# Patient Record
Sex: FEMALE | Race: WHITE
[De-identification: names, ages, dates, MRNs, and addresses within clinical notes are randomized per-mention and may not be internally consistent; named-entity substitution may affect disease eponyms.]

---

## 2017-12-06 ENCOUNTER — HOSPITAL ENCOUNTER (EMERGENCY)
Dept: HOSPITAL 10 - FTE | Age: 42
Discharge: HOME | End: 2017-12-06
Payer: MEDICAID

## 2017-12-06 VITALS
WEIGHT: 154.98 LBS | BODY MASS INDEX: 25.82 KG/M2 | HEIGHT: 65 IN | BODY MASS INDEX: 25.82 KG/M2 | WEIGHT: 154.98 LBS | HEIGHT: 65 IN

## 2017-12-06 VITALS
RESPIRATION RATE: 18 BRPM | DIASTOLIC BLOOD PRESSURE: 69 MMHG | TEMPERATURE: 98.4 F | SYSTOLIC BLOOD PRESSURE: 119 MMHG | HEART RATE: 69 BPM

## 2017-12-06 DIAGNOSIS — I10: ICD-10-CM

## 2017-12-06 DIAGNOSIS — K80.50: Primary | ICD-10-CM

## 2017-12-06 LAB
ADD UMIC: YES
ALBUMIN SERPL-MCNC: 4 G/DL (ref 3.3–4.9)
ALBUMIN/GLOB SERPL: 0.97 {RATIO}
ALP SERPL-CCNC: 97 IU/L (ref 42–121)
ALT SERPL-CCNC: 54 IU/L (ref 13–69)
ANION GAP SERPL CALC-SCNC: 15 MMOL/L (ref 8–16)
AST SERPL-CCNC: 78 IU/L (ref 15–46)
BASOPHILS # BLD AUTO: 0 10^3/UL (ref 0–0.1)
BASOPHILS NFR BLD: 0.5 % (ref 0–2)
BILIRUB DIRECT SERPL-MCNC: 0 MG/DL (ref 0–0.2)
BILIRUB SERPL-MCNC: 0.1 MG/DL (ref 0.2–1.3)
BUN SERPL-MCNC: 21 MG/DL (ref 7–20)
CALCIUM SERPL-MCNC: 9.5 MG/DL (ref 8.4–10.2)
CHLORIDE SERPL-SCNC: 104 MMOL/L (ref 97–110)
CO2 SERPL-SCNC: 25 MMOL/L (ref 21–31)
COLOR UR: YELLOW
CREAT SERPL-MCNC: 0.9 MG/DL (ref 0.44–1)
EOSINOPHIL # BLD: 0.1 10^3/UL (ref 0–0.5)
EOSINOPHIL NFR BLD: 1.3 % (ref 0–7)
ERYTHROCYTE [DISTWIDTH] IN BLOOD BY AUTOMATED COUNT: 15.9 % (ref 11.5–14.5)
GLOBULIN SER-MCNC: 4.1 G/DL (ref 1.3–3.2)
GLUCOSE SERPL-MCNC: 107 MG/DL (ref 70–220)
GLUCOSE UR STRIP-MCNC: NEGATIVE MG/DL
HCT VFR BLD CALC: 35.3 % (ref 37–47)
HGB BLD-MCNC: 11.5 G/DL (ref 12–16)
KETONES UR STRIP.AUTO-MCNC: NEGATIVE MG/DL
LYMPHOCYTES # BLD AUTO: 2.8 10^3/UL (ref 0.8–2.9)
LYMPHOCYTES NFR BLD AUTO: 35.3 % (ref 15–51)
MCH RBC QN AUTO: 25.8 PG (ref 29–33)
MCHC RBC AUTO-ENTMCNC: 32.6 G/DL (ref 32–37)
MCV RBC AUTO: 79.3 FL (ref 82–101)
MONOCYTES # BLD: 0.6 10^3/UL (ref 0.3–0.9)
MONOCYTES NFR BLD: 7.4 % (ref 0–11)
NEUTROPHILS # BLD: 4.4 10^3/UL (ref 1.6–7.5)
NEUTROPHILS NFR BLD AUTO: 55.4 % (ref 39–77)
NITRITE UR QL STRIP.AUTO: NEGATIVE MG/DL
NRBC # BLD MANUAL: 0 10^3/UL (ref 0–0)
NRBC BLD AUTO-RTO: 0 /100WBC (ref 0–0)
PLATELET # BLD: 306 10^3/UL (ref 140–415)
PMV BLD AUTO: 11.6 FL (ref 7.4–10.4)
POTASSIUM SERPL-SCNC: 3.8 MMOL/L (ref 3.5–5.1)
PROT SERPL-MCNC: 8.1 G/DL (ref 6.1–8.1)
RBC # BLD AUTO: 4.45 10^6/UL (ref 4.2–5.4)
RBC # UR AUTO: NEGATIVE MG/DL
SODIUM SERPL-SCNC: 140 MMOL/L (ref 135–144)
SQUAMOUS #/AREA URNS HPF: (no result) /HPF
UR ASCORBIC ACID: 40 MG/DL
UR BILIRUBIN (DIP): NEGATIVE MG/DL
UR CLARITY: CLEAR
UR PH (DIP): 5 (ref 5–9)
UR RBC: 1 /HPF (ref 0–5)
UR SPECIFIC GRAVITY (DIP): 1.02 (ref 1–1.03)
UR TOTAL PROTEIN (DIP): (no result) MG/DL
UROBILINOGEN UR STRIP-ACNC: NEGATIVE MG/DL
WBC # BLD AUTO: 8 10^3/UL (ref 4.8–10.8)
WBC # UR STRIP: NEGATIVE LEU/UL

## 2017-12-06 PROCEDURE — 81001 URINALYSIS AUTO W/SCOPE: CPT

## 2017-12-06 PROCEDURE — 93005 ELECTROCARDIOGRAM TRACING: CPT

## 2017-12-06 PROCEDURE — 83690 ASSAY OF LIPASE: CPT

## 2017-12-06 PROCEDURE — 36415 COLL VENOUS BLD VENIPUNCTURE: CPT

## 2017-12-06 PROCEDURE — 84484 ASSAY OF TROPONIN QUANT: CPT

## 2017-12-06 PROCEDURE — 85025 COMPLETE CBC W/AUTO DIFF WBC: CPT

## 2017-12-06 PROCEDURE — 81003 URINALYSIS AUTO W/O SCOPE: CPT

## 2017-12-06 PROCEDURE — 71010: CPT

## 2017-12-06 PROCEDURE — 80053 COMPREHEN METABOLIC PANEL: CPT

## 2017-12-06 PROCEDURE — 76705 ECHO EXAM OF ABDOMEN: CPT

## 2017-12-06 NOTE — RADRPT
PROCEDURE:   US abdomen limited right upper quadrant. 

 

CLINICAL INDICATION:   Abdominal pain 

 

TECHNIQUE:   Multiple real-time images were acquired of the patient's right upper quadrant of the ab
domen utilizing a high resolution transducer. 

 

COMPARISON:   US ABDOMEN 4/14/2013

 

FINDINGS:

 

Multiple gallstones in the gallbladder.

There is no pericholecystic fluid or gallbladder wall thickening.  

The common bile duct measures 9.9 mm in maximal dimension.  

No free fluid is identified. No abnormality is seen in the pancreas or liver.  The right kidney elke
ures 10.2 cm in length and is unremarkable.

 

IMPRESSION:

Cholelithiasis again seen. Dilated common bile duct again seen. Please see above.

 

 

RPTAT: HJES

_____________________________________________ 

.Carlos Ulloa MD, MD           Date    Time 

Electronically viewed and signed by .Carlos Ulloa MD, MD on 12/06/2017 03:06 

 

D:  12/06/2017 03:06  T:  12/06/2017 03:06

.S/

## 2017-12-06 NOTE — ERD
ER Documentation


Chief Complaint


Chief Complaint


epigastric pain 30 min PTA. "feels inflammed" -n/v





HPI


42-year-old female presents here to emergency department for complaints of 

epigastric pain sudden onset tonight was sleeping.  Patient has had a history 

of gastritis before.  Patient describes the pain as sharp pain, succession scale

, not better or worse with anything.  Intermittent type of pain.  Patient 

denies any fever chills.  Patient denies any vomiting diarrhea or constipation.

  Patient denies any fever chills.





ROS


All systems reviewed and are negative except as per history of present illness.





Medications


Home Meds


Reported Medications


Prenatal Vit-Iron Fumarate-FA (Prenatal Vitamin Tablet) 1 Each Tablet, 1 EACH 

PO DAILY


   13





Allergies


Allergies:  


Coded Allergies:  


     No Known Drug Allergies (Verified  Allergy, Unknown, 17)





PMhx/Soc


History of Surgery:  Yes ()


Anesthesia Reaction:  No


Hx Neurological Disorder:  No


Hx Respiratory Disorders:  No


Hx Cardiac Disorders:  Yes (Hypertension)


Hx Psychiatric Problems:  No


Hx Miscellaneous Medical Probl:  No


Hx Alcohol Use:  No


Hx Substance Use:  No


Hx Tobacco Use:  No


Smoking Status:  Never smoker





FmHx


Family History:  No coronary disease, No diabetes, No other





Physical Exam


Vitals





Vital Signs








  Date Time  Temp Pulse Resp B/P Pulse Ox O2 Delivery O2 Flow Rate FiO2


 


17 02:01 97.2 71 18 124/71 99   








Physical Exam


GENERAL:  The patient is well developed and appropriate for usual state of 

health, in no apparent distress.


CHEST:  Clear to auscultation bilaterally. There are no rales, wheezes or 

rhonchi. 


HEART:  Regular rate and rhythm. No murmurs, clicks, rubs or gallops. No S3 or 

S4.


ABDOMEN:  Soft, nontender and nondistended. Good bowel sounds. No rebound or 

guarding. No gross peritonitis. No gross organomegaly or masses. No Pierre sign 

or McBurney point tenderness.


BACK:  No midline or flank tenderness.


EXTREMITIES:  Equal pulses bilaterally. There is no peripheral clubbing, 

cyanosis or edema. No focal swelling or erythema. Full range of motion. Grossly 

neurovascularly intact.


NEURO:  Alert and oriented. Cranial nerves 2-12 intact. Motor strength in all 4 

extremities with 5/5 strength.  Sensation grossly intact. Normal speech and 

gait. 


SKIN:  There is no apparent rash or petechia. The skin is warm and dry.


HEMATOLOGIC AND LYMPHATIC:  There is no evidence of excessive bruising or 

lymphedema. No gross cervical, axillary, or inguinal lymphadenopathy.


Result Diagram:  


17 0245                                                                   

             17 0245





Results 24 hrs





 Laboratory Tests








Test


  17


02:11 17


02:36 17


02:45


 


Troponin I < 0.012ng/ml   


 


Bedside Urine pH (LAB)  6.0  


 


Bedside Urine Protein (LAB)  2+  


 


Bedside Urine Glucose (UA)  Negative  


 


Bedside Urine Ketones (LAB)  Negative  


 


Bedside Urine Blood  Trace-intact  


 


Bedside Urine Nitrite (LAB)  Negative  


 


Bedside Urine Leukocyte


Esterase (L 


  Negative 


  


 


 


White Blood Count   8.010^3/ul 


 


Red Blood Count   4.4510^6/ul 


 


Hemoglobin   11.5g/dl 


 


Hematocrit   35.3% 


 


Mean Corpuscular Volume   79.3fl 


 


Mean Corpuscular Hemoglobin   25.8pg 


 


Mean Corpuscular Hemoglobin


Concent 


  


  32.6g/dl 


 


 


Red Cell Distribution Width   15.9% 


 


Platelet Count   60071^3/UL 


 


Mean Platelet Volume   11.6fl 


 


Neutrophils %   55.4% 


 


Lymphocytes %   35.3% 


 


Monocytes %   7.4% 


 


Eosinophils %   1.3% 


 


Basophils %   0.5% 


 


Nucleated Red Blood Cells %   0.0/100WBC 


 


Neutrophils #   4.410^3/ul 


 


Lymphocytes #   2.810^3/ul 


 


Monocytes #   0.610^3/ul 


 


Eosinophils #   0.110^3/ul 


 


Basophils #   0.010^3/ul 


 


Nucleated Red Blood Cells #   0.010^3/ul 


 


Urine Color   YELLOW 


 


Urine Clarity   CLEAR 


 


Urine pH   5.0 


 


Urine Specific Gravity   1.020 


 


Urine Ketones   NEGATIVEmg/dL 


 


Urine Nitrite   NEGATIVEmg/dL 


 


Urine Bilirubin   NEGATIVEmg/dL 


 


Urine Urobilinogen   NEGATIVEmg/dL 


 


Urine Leukocyte Esterase   NEGATIVELeu/ul 


 


Urine Microscopic RBC   1/HPF 


 


Urine Microscopic WBC   3/HPF 


 


Urine Squamous Epithelial


Cells 


  


  FEW/HPF 


 


 


Urine Hemoglobin   NEGATIVEmg/dL 


 


Urine Glucose   NEGATIVEmg/dL 


 


Urine Total Protein   1+mg/dl 


 


Sodium Level   140mmol/L 


 


Potassium Level   3.8mmol/L 


 


Chloride Level   104mmol/L 


 


Carbon Dioxide Level   25mmol/L 


 


Anion Gap   15 


 


Blood Urea Nitrogen   21mg/dl 


 


Creatinine   0.90mg/dl 


 


Glucose Level   107mg/dl 


 


Calcium Level   9.5mg/dl 


 


Total Bilirubin   0.1mg/dl 


 


Direct Bilirubin   0.00mg/dl 


 


Indirect Bilirubin   0.1mg/dl 


 


Aspartate Amino Transf


(AST/SGOT) 


  


  78IU/L 


 


 


Alanine Aminotransferase


(ALT/SGPT) 


  


  54IU/L 


 


 


Alkaline Phosphatase   97IU/L 


 


Total Protein   8.1g/dl 


 


Albumin   4.0g/dl 


 


Globulin   4.10g/dl 


 


Albumin/Globulin Ratio   0.97 


 


Lipase   69U/L 





EKG was done, read by me and is normal sinus rhythm at a rate of 71, normal axis

, there is no ST changes or changes in the EKG that indicates any cardiac 

emergencies at this time. Patient's EKG was also reviewed by Dr. Sweeney. 

Impression: no acute findings on EKG








PROCEDURE:   XR Chest. 


 


CLINICAL INDICATION:    Epigastric pain


 


TECHNIQUE:   Single frontal view  of the chest was obtained 


 


COMPARISON:   None 


 


FINDINGS:


The heart and mediastinum are within normal limits.  


The lungs are clear.


There is appearance of minimal blunting of costophrenic angles which could be 

secondary small pleural effusions. 


 


 


IMPRESSION:


Suggestive of very small pleural effusions.


 


 


RPTAT: HJES


_____________________________________________ 


.Carlos Ulloa MD, MD           Date    Time 


Electronically viewed and signed by .Carlos Ulloa MD, MD on 2017 03:24 


 


D:  2017 03:24  T:  2017 03:24


.S/





CC: PER CHIU NP





Procedures/MDM


Medical Decision Making: Patient symptoms of epigastric pain most likely is 

consistent with biliary colic.  There is very few pleural effusions noted 

incidentally in the chest x-ray, as per discussion with my attending physician, 

Dr. Sweeney, low suspicion for any CHF or any acute pulmonary or 

cardiopulmonary emergencies at this time.  Troponins negative, considering 

patient has been having the pain since yesterday, negative troponin is low risk 

for acute coronary syndrome.  Chest x-ray does not show any pneumothorax.  

Ultrasound shows multiple gallbladder stones mildly dilated common bile duct, 

lipase is normal, liver functions are normal, consistent with biliary colic.  

Outpatient management is appropriate at this time.  There is low suspicion for 

abdominal emergencies at this time. Patients abdominal exam is normal at this 

time. Patients radiology exam does not show any abdominal emergencies at this 

time. There is low suspicion for appendicitis, cholecystitis, abdominal aortic 

aneurysms or peritonitis at this time.  There is low suspicion for sepsis. 

Patient appears well and is hemodynamically stable.





Disposition: Home. Condition: Stable


Prescription Norco, Zofran, omeprazole


Instructions: Patient is advised to take medications as prescribed. Patient is 

advised to rest, increase fluid intake and do brat diet for next 1-2 days and 

progress as tolerated. Patient is advised that if symptoms are worse, severe 

abdominal pain, uncontrolled vomiting, high fever, severe flank pain, worst 

signs and symptoms, to return to the emergency department immediately.  

Otherwise, patient can follow up with primary care doctor in 5-7 days. 








Disclaimer: Inadvertent spelling and grammatical errors are likely due to EHR/

dictation software use and do not reflect on the overall quality of patient 

care. Also, please note that the electronic time recorded on this note does not 

necessarily reflect the actual time of the patient encounter.





Departure


Diagnosis:  


 Primary Impression:  


 Biliary colic


Condition:  Stable


Patient Instructions:  Biliary Colic With Gallstone (Confirmed)





Additional Instructions:  


Patient is advised to take medications as prescribed. Patient is advised to rest

, increase fluid intake and do brat diet for next 1-2 days and progress as 

tolerated. Patient is advised that if symptoms are worse, severe abdominal pain

, uncontrolled vomiting, high fever, severe flank pain, worst signs and symptoms

, to return to the emergency department immediately.  Otherwise, patient can 

follow up with primary care doctor in 5-7 days.











PER CHIU NP Dec 6, 2017 03:23

## 2017-12-06 NOTE — RADRPT
PROCEDURE:   XR Chest. 

 

CLINICAL INDICATION:    Epigastric pain

 

TECHNIQUE:   Single frontal view  of the chest was obtained 

 

COMPARISON:   None 

 

FINDINGS:

The heart and mediastinum are within normal limits.  

The lungs are clear.

There is appearance of minimal blunting of costophrenic angles which could be secondary small pleura
l effusions. 

 

 

IMPRESSION:

Suggestive of very small pleural effusions.

 

 

RPTAT: HJES

_____________________________________________ 

.Carlos Ulloa MD, MD           Date    Time 

Electronically viewed and signed by .Carlos Ulloa MD, MD on 12/06/2017 03:24 

 

D:  12/06/2017 03:24  T:  12/06/2017 03:24

.S/

## 2019-01-15 ENCOUNTER — HOSPITAL ENCOUNTER (EMERGENCY)
Dept: HOSPITAL 91 - FTE | Age: 44
Discharge: HOME | End: 2019-01-15
Payer: MEDICAID

## 2019-01-15 ENCOUNTER — HOSPITAL ENCOUNTER (EMERGENCY)
Dept: HOSPITAL 10 - FTE | Age: 44
Discharge: HOME | End: 2019-01-15
Payer: MEDICAID

## 2019-01-15 VITALS
HEIGHT: 61 IN | BODY MASS INDEX: 30.39 KG/M2 | BODY MASS INDEX: 30.39 KG/M2 | WEIGHT: 160.94 LBS | WEIGHT: 160.94 LBS | HEIGHT: 61 IN

## 2019-01-15 VITALS — HEART RATE: 82 BPM | SYSTOLIC BLOOD PRESSURE: 162 MMHG | RESPIRATION RATE: 18 BRPM | DIASTOLIC BLOOD PRESSURE: 72 MMHG

## 2019-01-15 DIAGNOSIS — R10.31: Primary | ICD-10-CM

## 2019-01-15 DIAGNOSIS — I10: ICD-10-CM

## 2019-01-15 LAB
URINE PH (DIP) POC: 6 (ref 5–8.5)
URINE TOTAL PROTEIN POC: (no result)

## 2019-01-15 PROCEDURE — 81003 URINALYSIS AUTO W/O SCOPE: CPT

## 2019-01-15 PROCEDURE — 99282 EMERGENCY DEPT VISIT SF MDM: CPT

## 2019-01-15 NOTE — ERD
ER Documentation


Chief Complaint


Chief Complaint





RIGHT LOWER PELVIC PAIN X 1 MONTH--WORSE TODAY





HPI


44-year-old female complaining of right groin pain times 1 month.  Patient 


described pain as burning-like sensation, only had pain when she is lifting her 


right leg.  Patient states that the pain initially onset when she bent over to 


pick something on the floor.  Patient works as a , her work involves 


pushing furniture around.  Denies back pain, saddle paresthesia, bowel bladder 


dysfunction, or dysuria.





ROS


All systems reviewed and are negative except as per history of present illness.





Medications


Home Meds


Active Scripts


Ibuprofen* (Motrin*) 600 Mg Tab, 600 MG PO Q6H PRN for PAIN AND OR ELEVATED 


TEMP, #30 TAB


   Prov:SAMANTHA HUDSON NP         1/15/19


Omeprazole* (Omeprazole*) 20 Mg Capsule.dr, 20 MG PO DAILY, #30


   Prov:PER CHIU NP         17


Ondansetron (Ondansetron Odt) 4 Mg Tab.rapdis, 4 MG PO Q6H PRN for NAUSEA AND/OR


VOMITING, #20 TAB


   Prov:PER CHIU NP         17


Hydrocodone/Acetaminophen (Norco 5-325 Tablet) 1 Each Tablet, 1 TAB PO Q6H PRN 


for PAIN, #20 TAB


   Prov:PER CHIU NP         17


Reported Medications


Prenatal Vit-Iron Fumarate-FA (Prenatal Vitamin Tablet) 1 Each Tablet, 1 EACH PO


DAILY


   13





Allergies


Allergies:  


Coded Allergies:  


     No Known Drug Allergies (Verified  Allergy, Unknown, 17)





PMhx/Soc


History of Surgery:  Yes ()


Anesthesia Reaction:  No


Hx Neurological Disorder:  No


Hx Respiratory Disorders:  No


Hx Cardiac Disorders:  Yes (Hypertension)


Hx Psychiatric Problems:  No


Hx Miscellaneous Medical Probl:  No


Hx Alcohol Use:  No


Hx Substance Use:  No


Hx Tobacco Use:  No


Smoking Status:  Never smoker





Physical Exam


Vitals





Vital Signs


  Date      Temp  Pulse  Resp  B/P (MAP)   Pulse Ox  O2          O2 Flow    FiO2


Time                                                 Delivery    Rate


   1/15/19  98.0     83    18      198/92        99


     19:59                          (127)





Physical Exam


General:    Well-developed, well-nourished, conscious and coherent, in no 


distress


Skin:    Warm and dry without rash, good texture and turgor


Head:    Normocephalic without evidence of trauma


Neck:    Supple without meningismus or adenopathy.  Carotids are equal.  Trachea


midline.  No bruits or JVD


Chest:    Normal AP diameter.  Good expansion without retractions.  Nontender.  


Lungs are clear to auscultate bilaterally with good tidal volume


Heart:    Regular rate and rhythm.  No murmur, rub, or gallops heard


Abdomen:    Soft and nontender without masses, guarding, or rebound.  Bowel 


sounds are active.  No hepatosplenomegaly


Back:    Without spinal or CVA tenderness


Pelvis:     Tenderness over the right psoas muscle.  Pain with active range of 


motion of the right leg, no pain with passive range of motion.


Extremities:    Full range of motion.  Good strength bilaterally.  No erythema, 


ecchymosis, or edema. Peripheral pulses are intact. Sensation intact


Neuro:    Alert and oriented 4, GCS 15.


Results 24 hrs





Laboratory Tests


               Test
                                1/15/19
21:49


               Bedside Urine pH (LAB)                        6.0


               Bedside Urine Protein (LAB)                    2+


               Bedside Urine Glucose (UA)           Negative


               Bedside Urine Ketones (LAB)          Negative


               Bedside Urine Blood                  Trace-lysed


               Bedside Urine Nitrite (LAB)          Negative


               Bedside Urine Leukocyte
Esterase (L  Negative 









Procedures/MDM


Well-appearing 44-year-old female present ED was right groin pain times 1 month.


 No inguinal hernia noted on exam.  Patient's pain is related with leg movement,


she is noted to have psoas muscle tenderness on exam.  I suspect that she has a 


muscle strain.  UA is negative for urinary tract infection or hematuria.  Low 


suspicion for urolithiasis.





 Patient appears well, stable for discharge and outpatient management. Medical d


ecision making shared with patient and family. Education provided to patient and


family. Patient and family expressed understanding of the plan.





Medications on discharge: Ibuprofen.


Follow-up: Primary care provider in 2-3 days or return to ED if worse.





Disclaimer: Inadvertent spelling and grammatical errors are likely due to 


EHR/dictation software use and do not reflect on the overall quality of patient 


care. Also, please note that the electronic time recorded on this note does not 


necessarily reflect the actual time of the patient encounter.





Departure


Diagnosis:  


   Primary Impression:  


   Right groin pain


Condition:  Stable


Patient Instructions:  Muscle Strain, Abdomen


Referrals:  


[facility]


Usted se ha hecho un examen mdico de control que le indica que no est en son 


condicin que requiera tratamiento urgente en el Departamento de Emergencia. Un 


estudio ms profundo y el tratamiento de mercedes condicin pueden esperar sin ningn 


riesgo hasta que usted sea atendida/o en el consultorio de mercedes mdico o son clni


ca. Es responsabilidad suya arreglar son lamine para el seguimiento del raabella. 





MANEJO DE CONDICIONES NO URGENTES EN EL FUTURO


1) Si usted tiene un mdico de atencin primaria:





Usted debera llamar a mercedes mdico de atencin primaria antes de venir al departam


ento de emergencia. Despus de las horas de consultorio, mercedes doctor o mercedes 


asociado/a est disponible por telfono. El mdico o enfermero de leonid en el 


servicio telefnico puede asesorarle por alma rosa medio para atender el problema, o 


arabella contrario se puede programar son lamine.





2) Si usted no tiene un mdico de atencin primaria:


Llame al mdico o clnica de referencia que aparece abajo gita las horas de 


consultorio para hacer son lamine para que le vean.





CLINICAS:


[facility]  [phone] [address] [facility]  [phone] [address] [facility] [phone] [address] [facility]. [facility]  [phone]


[address] [facility] [facility] [place] [phone] [address] [facility]. [phone] 


[address]





Additional Instructions:  


Llame al doctor nombrado abajo (Referral Sources) MAANA y liat son LAMINE PARA 


DENTRO DE SON SEMANA.


Dgale a la secretaria que nosotros le instruimos hacer esta lamine.Avise o llame 


si mercedes condicin se empeora antes de la lamine.





Ask your primary provider for a referral to physical therapy.











SAMANTHA HUDSON. ABHILASH               Brant 15, 2019 22:19